# Patient Record
Sex: FEMALE | Race: BLACK OR AFRICAN AMERICAN | NOT HISPANIC OR LATINO | URBAN - METROPOLITAN AREA
[De-identification: names, ages, dates, MRNs, and addresses within clinical notes are randomized per-mention and may not be internally consistent; named-entity substitution may affect disease eponyms.]

---
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
## 2022-05-27 ENCOUNTER — HOSPITAL ENCOUNTER (EMERGENCY)
Facility: HOSPITAL | Age: 22
Discharge: HOME | End: 2022-05-28
Attending: EMERGENCY MEDICINE | Admitting: EMERGENCY MEDICINE

## 2022-05-27 DIAGNOSIS — N39.0 ACUTE UTI: Primary | ICD-10-CM

## 2022-05-27 LAB
B-HCG UR QL: NEGATIVE
BACTERIA UR QL AUTO: ABNORMAL /HPF
BILIRUB UR QL STRIP: NEGATIVE
CLARITY UR: ABNORMAL
COLOR UR: YELLOW
GLUCOSE UR STRIP-MCNC: NEGATIVE MG/DL
HGB UR QL STRIP.AUTO: ABNORMAL
HYALINE CASTS UR QL AUTO: ABNORMAL /LPF
KETONES UR QL STRIP: NEGATIVE
LEUKOCYTE ESTERASE UR QL STRIP.AUTO: ABNORMAL
NITRITE UR QL STRIP: NEGATIVE
PH UR STRIP.AUTO: 6.5 [PH] (ref 5–8)
PROT UR QL STRIP: ABNORMAL
RBC # UR STRIP: ABNORMAL /HPF
REF LAB TEST METHOD: ABNORMAL
SP GR UR STRIP: 1.02 (ref 1–1.03)
SQUAMOUS #/AREA URNS HPF: ABNORMAL /HPF
UROBILINOGEN UR QL STRIP: ABNORMAL
WBC # UR STRIP: ABNORMAL /HPF

## 2022-05-27 PROCEDURE — 81001 URINALYSIS AUTO W/SCOPE: CPT | Performed by: EMERGENCY MEDICINE

## 2022-05-27 PROCEDURE — 99283 EMERGENCY DEPT VISIT LOW MDM: CPT

## 2022-05-27 PROCEDURE — 81025 URINE PREGNANCY TEST: CPT | Performed by: EMERGENCY MEDICINE

## 2022-05-27 RX ORDER — CEPHALEXIN 500 MG/1
500 CAPSULE ORAL 3 TIMES DAILY
Qty: 15 CAPSULE | Refills: 0 | Status: SHIPPED | OUTPATIENT
Start: 2022-05-27 | End: 2022-06-01

## 2022-05-28 VITALS
BODY MASS INDEX: 19.49 KG/M2 | SYSTOLIC BLOOD PRESSURE: 116 MMHG | OXYGEN SATURATION: 98 % | RESPIRATION RATE: 16 BRPM | DIASTOLIC BLOOD PRESSURE: 84 MMHG | HEIGHT: 63 IN | WEIGHT: 110 LBS | HEART RATE: 80 BPM | TEMPERATURE: 97 F

## 2022-05-28 RX ORDER — CEPHALEXIN 500 MG/1
500 CAPSULE ORAL ONCE
Status: COMPLETED | OUTPATIENT
Start: 2022-05-28 | End: 2022-05-28

## 2022-05-28 RX ADMIN — CEPHALEXIN 500 MG: 500 CAPSULE ORAL at 00:33

## 2023-07-24 ENCOUNTER — OFFICE VISIT (OUTPATIENT)
Dept: OBSTETRICS AND GYNECOLOGY | Facility: CLINIC | Age: 23
End: 2023-07-24
Payer: COMMERCIAL

## 2023-07-24 VITALS
DIASTOLIC BLOOD PRESSURE: 62 MMHG | BODY MASS INDEX: 19.14 KG/M2 | SYSTOLIC BLOOD PRESSURE: 99 MMHG | WEIGHT: 108 LBS | HEIGHT: 63 IN

## 2023-07-24 DIAGNOSIS — B00.9 HSV INFECTION: Primary | ICD-10-CM

## 2023-07-24 DIAGNOSIS — Z11.3 SCREENING FOR STD (SEXUALLY TRANSMITTED DISEASE): ICD-10-CM

## 2023-07-24 DIAGNOSIS — Z32.00 POSSIBLE PREGNANCY, NOT CONFIRMED: ICD-10-CM

## 2023-07-24 LAB
B-HCG UR QL: NEGATIVE
EXPIRATION DATE: NORMAL
INTERNAL NEGATIVE CONTROL: NEGATIVE
INTERNAL POSITIVE CONTROL: POSITIVE
Lab: NORMAL

## 2023-07-24 PROCEDURE — 81025 URINE PREGNANCY TEST: CPT | Performed by: NURSE PRACTITIONER

## 2023-07-24 PROCEDURE — 1160F RVW MEDS BY RX/DR IN RCRD: CPT | Performed by: NURSE PRACTITIONER

## 2023-07-24 PROCEDURE — 99203 OFFICE O/P NEW LOW 30 MIN: CPT | Performed by: NURSE PRACTITIONER

## 2023-07-24 PROCEDURE — 1159F MED LIST DOCD IN RCRD: CPT | Performed by: NURSE PRACTITIONER

## 2023-07-24 RX ORDER — LIDOCAINE HYDROCHLORIDE 20 MG/ML
JELLY TOPICAL 3 TIMES DAILY PRN
Qty: 5 ML | Refills: 4 | Status: SHIPPED | OUTPATIENT
Start: 2023-07-24

## 2023-07-24 RX ORDER — VALACYCLOVIR HYDROCHLORIDE 500 MG/1
1000 TABLET, FILM COATED ORAL DAILY
Qty: 90 TABLET | Refills: 3 | Status: SHIPPED | OUTPATIENT
Start: 2023-07-24

## 2023-07-24 NOTE — PROGRESS NOTES
"Chief Complaint   Patient presents with    ngyn     Discuss herpes outbreak and pregnancy test        SUBJECTIVE:     Sergei Up is a 22 y.o.  who presents to f/u newly diagnosed HSV infection. This is a new problem. She complaints painful lesions and pain with urination due to urine touching lesions. Denies S&S UTI.  Lesions are bilateral. She has recently had one new partner, this was 2 days prior to onset of symptoms. First symptoms started 23. She was seen at Urgent [facility] at [facility] with confirmatory serum testing for HSV 1. She was treated with valtrex and also given naproxen. She has not started naproxen yet because she thought valtrex was supposed to help with the pain.     LMP 23. She requests urine hcg today. She c/o frequent BV infections. Currently taking flagyl following positive BV testing at . She would like STD testing today.     This is my first time meeting Sergei Up  She is new to our office  History reviewed. No pertinent past medical history.   History reviewed. No pertinent surgical history.     Review of Systems   Constitutional:  Negative for chills, fatigue and fever.   Gastrointestinal:  Negative for abdominal distention and abdominal pain.   Genitourinary:  Positive for genital sores and vaginal pain. Negative for dyspareunia, dysuria, frequency, menstrual problem, pelvic pain, urgency, vaginal bleeding and vaginal discharge.   Musculoskeletal:  Negative for back pain.     OBJECTIVE:   Vitals:    23 1325   BP: 99/62   Weight: 49 kg (108 lb)   Height: 160 cm (62.99\")        Physical Exam  Constitutional:       General: She is not in acute distress.     Appearance: Normal appearance. She is not ill-appearing, toxic-appearing or diaphoretic.   Genitourinary:      Right Labia: lesions.      Right Labia: No rash, tenderness, skin changes or Bartholin's cyst.     Left Labia: lesions.      Left Labia: No tenderness, skin changes, Bartholin's cyst or " rash.     No labial fusion noted.      Vulva exam comments: Multiple ulcerated lesions at introitus. She is intolerant of atraumatic introduction of vaginal speculum as a result  1 vesicle noted right labia minora. .      No inguinal adenopathy present in the right or left side.  Cardiovascular:      Rate and Rhythm: Normal rate.   Pulmonary:      Effort: Pulmonary effort is normal.   Abdominal:      General: There is no distension.      Palpations: Abdomen is soft. There is no mass.      Tenderness: There is no abdominal tenderness. There is no guarding.      Hernia: No hernia is present. There is no hernia in the left inguinal area or right inguinal area.   Musculoskeletal:         General: Normal range of motion.      Cervical back: Normal range of motion.   Lymphadenopathy:      Lower Body: No right inguinal adenopathy. No left inguinal adenopathy.   Neurological:      General: No focal deficit present.      Mental Status: She is alert and oriented to person, place, and time.      Cranial Nerves: No cranial nerve deficit.   Skin:     General: Skin is warm and dry.   Psychiatric:         Mood and Affect: Mood normal.         Behavior: Behavior normal.         Thought Content: Thought content normal.         Judgment: Judgment normal.   Vitals and nursing note reviewed.     Brief Urine Lab Results  (Last result in the past 365 days)        Color   Clarity   Blood   Leuk Est   Nitrite   Protein   CREAT   Urine HCG        07/24/23 1349               Negative               Assessment/Plan    Diagnoses and all orders for this visit:    1. HSV infection (Primary)    2. Possible pregnancy, not confirmed  -     POC Pregnancy, Urine    3. Screening for STD (sexually transmitted disease)  -     Chlamydia trachomatis, Neisseria gonorrhoeae, Trichomonas vaginalis, PCR - Swab, Cervix    Other orders  -     valACYclovir (Valtrex) 500 MG tablet; Take 2 tablets by mouth Daily.  Dispense: 90 tablet; Refill: 3  -     Lidocaine HCl  gel (XYLOCAINE) 2 % gel; Apply  topically to the appropriate area as directed 3 (Three) Times a Day As Needed for Mild Pain.  Dispense: 5 mL; Refill: 4    Discussed HSV in detail. Reviewed transmission and viral shedding. Enc. condoms with IC. Discussed S&S of outbreak. Encouraged avoidance of IC when lesions are present. BID sitz bath and lidocaine jelly PRN painful lesions. Reviewed suppressive therapy vs episodic therapy. She desires to begin suppressive therapy.  Vaginal cultures collected via blind sweep as she has significant pain with atraumatic introduction of vaginal speculum.   Encouraged to begin naproxen. Discussed appropriate dosing of tylenol as well for pain  Urine hcg negative today in office  Discussed recommendations for AE and pap smear screenings. Recommend scheduling AE after she turns 23.     Return in about 5 months (around 12/29/2023), or if symptoms worsen or fail to improve, for Annual physical.    Alanis Rivera, APRN  7/24/2023  14:29 EDT

## 2023-07-26 ENCOUNTER — PATIENT ROUNDING (BHMG ONLY) (OUTPATIENT)
Dept: OBSTETRICS AND GYNECOLOGY | Facility: CLINIC | Age: 23
End: 2023-07-26
Payer: COMMERCIAL

## 2023-07-26 ENCOUNTER — PATIENT MESSAGE (OUTPATIENT)
Dept: OBSTETRICS AND GYNECOLOGY | Facility: CLINIC | Age: 23
End: 2023-07-26
Payer: COMMERCIAL

## 2023-07-26 LAB
C TRACH RRNA SPEC QL NAA+PROBE: NEGATIVE
N GONORRHOEA RRNA SPEC QL NAA+PROBE: NEGATIVE
T VAGINALIS RRNA SPEC QL NAA+PROBE: NEGATIVE

## 2023-07-26 NOTE — PROGRESS NOTES
My chart message has been sent to the patient for PATIENT [place] with [facility] [place].

## 2023-08-09 ENCOUNTER — TELEPHONE (OUTPATIENT)
Dept: OBSTETRICS AND GYNECOLOGY | Facility: CLINIC | Age: 23
End: 2023-08-09
Payer: COMMERCIAL

## 2023-08-09 NOTE — TELEPHONE ENCOUNTER
Left message with female to have pt call office about vaginal cultures.  Negative for chlamydia, gonorrhoeae and trichomonas.    Luna